# Patient Record
Sex: MALE | Race: ASIAN | NOT HISPANIC OR LATINO | URBAN - METROPOLITAN AREA
[De-identification: names, ages, dates, MRNs, and addresses within clinical notes are randomized per-mention and may not be internally consistent; named-entity substitution may affect disease eponyms.]

---

## 2023-10-11 ENCOUNTER — EMERGENCY (EMERGENCY)
Facility: HOSPITAL | Age: 38
LOS: 1 days | Discharge: HOME | End: 2023-10-11
Attending: STUDENT IN AN ORGANIZED HEALTH CARE EDUCATION/TRAINING PROGRAM
Payer: MEDICAID

## 2023-10-11 VITALS
RESPIRATION RATE: 20 BRPM | OXYGEN SATURATION: 98 % | HEART RATE: 120 BPM | SYSTOLIC BLOOD PRESSURE: 154 MMHG | WEIGHT: 160.94 LBS | DIASTOLIC BLOOD PRESSURE: 110 MMHG | TEMPERATURE: 98 F

## 2023-10-11 PROCEDURE — 30903 CONTROL OF NOSEBLEED: CPT | Mod: LT

## 2023-10-11 PROCEDURE — 99283 EMERGENCY DEPT VISIT LOW MDM: CPT

## 2023-10-11 PROCEDURE — 99283 EMERGENCY DEPT VISIT LOW MDM: CPT | Mod: 25

## 2023-10-11 RX ORDER — TRANEXAMIC ACID 100 MG/ML
5 INJECTION, SOLUTION INTRAVENOUS ONCE
Refills: 0 | Status: COMPLETED | OUTPATIENT
Start: 2023-10-11 | End: 2023-10-11

## 2023-10-11 RX ADMIN — TRANEXAMIC ACID 5 MILLILITER(S): 100 INJECTION, SOLUTION INTRAVENOUS at 22:57

## 2023-10-11 NOTE — ED PROVIDER NOTE - CLINICAL SUMMARY MEDICAL DECISION MAKING FREE TEXT BOX
37M Presenting with nose bleed. had recent surgery, possible instrumentation or packing removed today but patient was unclear. patient instructed to apply pressure and ice pack. will reassess need for TXA. 37M Presenting with nose bleed. had recent surgery, possible instrumentation or packing removed today but patient was unclear. patient instructed to apply pressure and ice pack. will reassess need for TXA.    used  service to obtain further history. reports he was chronically congested and had a laser surgery to widen his nasal passage. today they removed some debris. txa and rhino rocket applied. will continue to observe. 37M Presenting with nose bleed. had recent surgery, possible instrumentation or packing removed today but patient was unclear. patient instructed to apply pressure and ice pack. will reassess need for TXA.    used  service to obtain further history. reports he was chronically congested and had a laser surgery to widen his nasal passage. today they removed some debris. txa and rhino rocket applied. will continue to observe.    bleeding stopped. will see his ENT tomorrow.

## 2023-10-11 NOTE — ED PROVIDER NOTE - NSFOLLOWUPINSTRUCTIONS_ED_ALL_ED_FT
You were seen in the emergency department for nose bleed after a surgery.     Please follow-up with your ENT physician tomorrow.     If you have any worsening symptoms, difficulty breathing, or you are unable to stop the bleeding, please return to the emergency department.

## 2023-10-11 NOTE — ED PROVIDER NOTE - OBJECTIVE STATEMENT
37M presenting with a nose bleed. patient reports a week ago he had a nasal surgery and today he followed up with his doctor who removed packing? discharge?. approximately 1 hour prior to arrival he developed nose bleeding that he was unable to stop.

## 2023-10-11 NOTE — ED PROVIDER NOTE - PATIENT PORTAL LINK FT
You can access the FollowMyHealth Patient Portal offered by [facility] by registering at the following website: [email or web address]. By joining "[employer]’s FollowMyHealth portal, you will also be able to view your health information using other applications (apps) compatible with our system.

## 2023-10-11 NOTE — ED ADULT TRIAGE NOTE - CHIEF COMPLAINT QUOTE
I had nasal surgery 1 week ago in both nostrils.  I was evaluated by my doctor today and now I am bleeding from my left nostril

## 2023-10-11 NOTE — ED PROVIDER NOTE - PHYSICAL EXAMINATION
General: well appearing male, no acute distress   HEENT: normocephalic, atraumatic, bleeding from left nare  Respiratory: normal work of breathing  MSK: no swelling or tenderness of lower extremities, moving all extremities spontaneously   Skin: warm, dry   Neuro: A&Ox3  Psych: appropriate affect

## 2023-10-12 NOTE — ED ADULT NURSE NOTE - OBJECTIVE STATEMENT
As per patient c/o nosebleed. Patient reports he had surgery x1 week in both nostrils. Bright red blood noted from left nostril. no apparent distress noted. Patient denies all other signs and symptoms.

## 2023-10-12 NOTE — ED ADULT NURSE REASSESSMENT NOTE - NS ED NURSE REASSESS COMMENT FT1
Patient to be discharged heart rate noted to be 130. Patient states that is his baseline, still wants to be discharged. Dr. Vera made aware. No apparent distress noted.

## 2023-10-12 NOTE — ED ADULT NURSE NOTE - NSFALLUNIVINTERV_ED_ALL_ED
Bed/Stretcher in lowest position, wheels locked, appropriate side rails in place/Call bell, personal items and telephone in reach/Instruct patient to call for assistance before getting out of bed/chair/stretcher/Non-slip footwear applied when patient is off stretcher/[place] to call system/Physically safe environment - no spills, clutter or unnecessary equipment/Purposeful proactive rounding/Room/bathroom lighting operational, light cord in reach
